# Patient Record
Sex: MALE | HISPANIC OR LATINO | Employment: OTHER | URBAN - METROPOLITAN AREA
[De-identification: names, ages, dates, MRNs, and addresses within clinical notes are randomized per-mention and may not be internally consistent; named-entity substitution may affect disease eponyms.]

---

## 2024-07-20 ENCOUNTER — APPOINTMENT (OUTPATIENT)
Dept: MRI IMAGING | Facility: CLINIC | Age: 39
End: 2024-07-20
Attending: NURSE PRACTITIONER

## 2024-07-20 ENCOUNTER — HOSPITAL ENCOUNTER (EMERGENCY)
Facility: CLINIC | Age: 39
Discharge: HOME | End: 2024-07-20
Attending: EMERGENCY MEDICINE | Admitting: EMERGENCY MEDICINE

## 2024-07-20 ENCOUNTER — APPOINTMENT (OUTPATIENT)
Dept: CT IMAGING | Facility: CLINIC | Age: 39
End: 2024-07-20
Attending: EMERGENCY MEDICINE

## 2024-07-20 VITALS
BODY MASS INDEX: 27.78 KG/M2 | WEIGHT: 177 LBS | HEART RATE: 71 BPM | RESPIRATION RATE: 14 BRPM | SYSTOLIC BLOOD PRESSURE: 121 MMHG | TEMPERATURE: 97.9 F | OXYGEN SATURATION: 97 % | DIASTOLIC BLOOD PRESSURE: 89 MMHG | HEIGHT: 67 IN

## 2024-07-20 DIAGNOSIS — G51.0 BELL'S PALSY: ICD-10-CM

## 2024-07-20 DIAGNOSIS — R29.810 FACIAL DROOP: ICD-10-CM

## 2024-07-20 LAB
ALBUMIN SERPL BCG-MCNC: 4.2 G/DL (ref 3.5–5.2)
ALP SERPL-CCNC: 79 U/L (ref 40–150)
ALT SERPL W P-5'-P-CCNC: 45 U/L (ref 0–70)
ANION GAP SERPL CALCULATED.3IONS-SCNC: 10 MMOL/L (ref 7–15)
AST SERPL W P-5'-P-CCNC: 32 U/L (ref 0–45)
ATRIAL RATE - MUSE: 56 BPM
BASOPHILS # BLD AUTO: 0 10E3/UL (ref 0–0.2)
BASOPHILS NFR BLD AUTO: 0 %
BILIRUB SERPL-MCNC: 0.4 MG/DL
BUN SERPL-MCNC: 18.8 MG/DL (ref 6–20)
CALCIUM SERPL-MCNC: 8.8 MG/DL (ref 8.8–10.4)
CHLORIDE SERPL-SCNC: 105 MMOL/L (ref 98–107)
CREAT SERPL-MCNC: 0.9 MG/DL (ref 0.67–1.17)
CRP SERPL-MCNC: <3 MG/L
DIASTOLIC BLOOD PRESSURE - MUSE: NORMAL MMHG
EGFRCR SERPLBLD CKD-EPI 2021: >90 ML/MIN/1.73M2
EOSINOPHIL # BLD AUTO: 0.2 10E3/UL (ref 0–0.7)
EOSINOPHIL NFR BLD AUTO: 3 %
ERYTHROCYTE [DISTWIDTH] IN BLOOD BY AUTOMATED COUNT: 12.4 % (ref 10–15)
ERYTHROCYTE [SEDIMENTATION RATE] IN BLOOD BY WESTERGREN METHOD: 9 MM/HR (ref 0–15)
GLUCOSE SERPL-MCNC: 107 MG/DL (ref 70–99)
HCO3 SERPL-SCNC: 24 MMOL/L (ref 22–29)
HCT VFR BLD AUTO: 43.6 % (ref 40–53)
HGB BLD-MCNC: 15.6 G/DL (ref 13.3–17.7)
IMM GRANULOCYTES # BLD: 0 10E3/UL
IMM GRANULOCYTES NFR BLD: 0 %
INTERPRETATION ECG - MUSE: NORMAL
LYMPHOCYTES # BLD AUTO: 1.7 10E3/UL (ref 0.8–5.3)
LYMPHOCYTES NFR BLD AUTO: 23 %
MCH RBC QN AUTO: 30.1 PG (ref 26.5–33)
MCHC RBC AUTO-ENTMCNC: 35.8 G/DL (ref 31.5–36.5)
MCV RBC AUTO: 84 FL (ref 78–100)
MONOCYTES # BLD AUTO: 0.5 10E3/UL (ref 0–1.3)
MONOCYTES NFR BLD AUTO: 7 %
NEUTROPHILS # BLD AUTO: 4.9 10E3/UL (ref 1.6–8.3)
NEUTROPHILS NFR BLD AUTO: 67 %
NRBC # BLD AUTO: 0 10E3/UL
NRBC BLD AUTO-RTO: 0 /100
P AXIS - MUSE: 54 DEGREES
PLATELET # BLD AUTO: 192 10E3/UL (ref 150–450)
POTASSIUM SERPL-SCNC: 4 MMOL/L (ref 3.4–5.3)
PR INTERVAL - MUSE: 170 MS
PROT SERPL-MCNC: 7 G/DL (ref 6.4–8.3)
QRS DURATION - MUSE: 106 MS
QT - MUSE: 422 MS
QTC - MUSE: 407 MS
R AXIS - MUSE: 28 DEGREES
RBC # BLD AUTO: 5.18 10E6/UL (ref 4.4–5.9)
SODIUM SERPL-SCNC: 139 MMOL/L (ref 135–145)
SYSTOLIC BLOOD PRESSURE - MUSE: NORMAL MMHG
T AXIS - MUSE: 24 DEGREES
VENTRICULAR RATE- MUSE: 56 BPM
WBC # BLD AUTO: 7.4 10E3/UL (ref 4–11)

## 2024-07-20 PROCEDURE — 70549 MR ANGIOGRAPH NECK W/O&W/DYE: CPT

## 2024-07-20 PROCEDURE — 99285 EMERGENCY DEPT VISIT HI MDM: CPT | Performed by: EMERGENCY MEDICINE

## 2024-07-20 PROCEDURE — 85025 COMPLETE CBC W/AUTO DIFF WBC: CPT | Performed by: EMERGENCY MEDICINE

## 2024-07-20 PROCEDURE — A9585 GADOBUTROL INJECTION: HCPCS | Performed by: EMERGENCY MEDICINE

## 2024-07-20 PROCEDURE — 255N000002 HC RX 255 OP 636: Performed by: EMERGENCY MEDICINE

## 2024-07-20 PROCEDURE — 93010 ELECTROCARDIOGRAM REPORT: CPT | Performed by: EMERGENCY MEDICINE

## 2024-07-20 PROCEDURE — 70544 MR ANGIOGRAPHY HEAD W/O DYE: CPT

## 2024-07-20 PROCEDURE — 85652 RBC SED RATE AUTOMATED: CPT | Performed by: EMERGENCY MEDICINE

## 2024-07-20 PROCEDURE — 80053 COMPREHEN METABOLIC PANEL: CPT | Performed by: EMERGENCY MEDICINE

## 2024-07-20 PROCEDURE — 93005 ELECTROCARDIOGRAM TRACING: CPT | Performed by: EMERGENCY MEDICINE

## 2024-07-20 PROCEDURE — 36415 COLL VENOUS BLD VENIPUNCTURE: CPT | Performed by: EMERGENCY MEDICINE

## 2024-07-20 PROCEDURE — 70553 MRI BRAIN STEM W/O & W/DYE: CPT

## 2024-07-20 PROCEDURE — 86140 C-REACTIVE PROTEIN: CPT | Performed by: EMERGENCY MEDICINE

## 2024-07-20 PROCEDURE — 99207 PR NO BILLABLE SERVICE THIS VISIT: CPT | Performed by: NURSE PRACTITIONER

## 2024-07-20 PROCEDURE — 250N000013 HC RX MED GY IP 250 OP 250 PS 637: Performed by: EMERGENCY MEDICINE

## 2024-07-20 PROCEDURE — 99285 EMERGENCY DEPT VISIT HI MDM: CPT | Mod: 25 | Performed by: EMERGENCY MEDICINE

## 2024-07-20 PROCEDURE — 70450 CT HEAD/BRAIN W/O DYE: CPT

## 2024-07-20 PROCEDURE — 250N000011 HC RX IP 250 OP 636: Performed by: EMERGENCY MEDICINE

## 2024-07-20 PROCEDURE — 250N000012 HC RX MED GY IP 250 OP 636 PS 637: Performed by: EMERGENCY MEDICINE

## 2024-07-20 PROCEDURE — 96374 THER/PROPH/DIAG INJ IV PUSH: CPT | Performed by: EMERGENCY MEDICINE

## 2024-07-20 PROCEDURE — 70553 MRI BRAIN STEM W/O & W/DYE: CPT | Mod: 52,76

## 2024-07-20 RX ORDER — PREDNISONE 20 MG/1
60 TABLET ORAL DAILY
Qty: 21 TABLET | Refills: 0 | Status: SHIPPED | OUTPATIENT
Start: 2024-07-20 | End: 2024-07-27

## 2024-07-20 RX ORDER — VALACYCLOVIR HYDROCHLORIDE 1 G/1
1000 TABLET, FILM COATED ORAL ONCE
Status: COMPLETED | OUTPATIENT
Start: 2024-07-20 | End: 2024-07-20

## 2024-07-20 RX ORDER — GADOBUTROL 604.72 MG/ML
0.1 INJECTION INTRAVENOUS ONCE
Status: COMPLETED | OUTPATIENT
Start: 2024-07-20 | End: 2024-07-20

## 2024-07-20 RX ORDER — POLYETHYLENE GLYCOL, PROPYLENE GLYCOL .4; .3 G/100ML; G/100ML
1 LIQUID OPHTHALMIC
Qty: 30 ML | Refills: 0 | Status: SHIPPED | OUTPATIENT
Start: 2024-07-20 | End: 2024-07-30

## 2024-07-20 RX ORDER — PREDNISONE 20 MG/1
60 TABLET ORAL ONCE
Status: COMPLETED | OUTPATIENT
Start: 2024-07-20 | End: 2024-07-20

## 2024-07-20 RX ORDER — ACETAMINOPHEN 500 MG
1000 TABLET ORAL ONCE
Status: COMPLETED | OUTPATIENT
Start: 2024-07-20 | End: 2024-07-20

## 2024-07-20 RX ORDER — KETOROLAC TROMETHAMINE 15 MG/ML
10 INJECTION, SOLUTION INTRAMUSCULAR; INTRAVENOUS ONCE
Status: COMPLETED | OUTPATIENT
Start: 2024-07-20 | End: 2024-07-20

## 2024-07-20 RX ORDER — VALACYCLOVIR HYDROCHLORIDE 1 G/1
1000 TABLET, FILM COATED ORAL 3 TIMES DAILY
Qty: 21 TABLET | Refills: 0 | Status: SHIPPED | OUTPATIENT
Start: 2024-07-20 | End: 2024-07-27

## 2024-07-20 RX ADMIN — VALACYCLOVIR HYDROCHLORIDE 1000 MG: 1 TABLET, FILM COATED ORAL at 14:46

## 2024-07-20 RX ADMIN — GADOBUTROL 8 ML: 604.72 INJECTION INTRAVENOUS at 12:40

## 2024-07-20 RX ADMIN — KETOROLAC TROMETHAMINE 10 MG: 15 INJECTION, SOLUTION INTRAMUSCULAR; INTRAVENOUS at 14:26

## 2024-07-20 RX ADMIN — POLYETHYLENE GLYCOL 400 AND PROPYLENE GLYCOL 1 DROP: 4; 3 SOLUTION/ DROPS OPHTHALMIC at 14:46

## 2024-07-20 RX ADMIN — GADOBUTROL 8 ML: 604.72 INJECTION INTRAVENOUS at 09:01

## 2024-07-20 RX ADMIN — ACETAMINOPHEN 1000 MG: 500 TABLET ORAL at 14:25

## 2024-07-20 RX ADMIN — PREDNISONE 60 MG: 20 TABLET ORAL at 14:26

## 2024-07-20 ASSESSMENT — ACTIVITIES OF DAILY LIVING (ADL)
ADLS_ACUITY_SCORE: 35
ADLS_ACUITY_SCORE: 35
ADLS_ACUITY_SCORE: 33
ADLS_ACUITY_SCORE: 35

## 2024-07-20 ASSESSMENT — COLUMBIA-SUICIDE SEVERITY RATING SCALE - C-SSRS
1. IN THE PAST MONTH, HAVE YOU WISHED YOU WERE DEAD OR WISHED YOU COULD GO TO SLEEP AND NOT WAKE UP?: NO
6. HAVE YOU EVER DONE ANYTHING, STARTED TO DO ANYTHING, OR PREPARED TO DO ANYTHING TO END YOUR LIFE?: NO
2. HAVE YOU ACTUALLY HAD ANY THOUGHTS OF KILLING YOURSELF IN THE PAST MONTH?: NO

## 2024-07-20 NOTE — ED NOTES
MRI check list completed via . Pt denies having any metal fragments, implanted electrical devices joint or limb replacements. Pt denies any past surgeries or procedures.  paged and arriving in 30 minutes.

## 2024-07-20 NOTE — CONSULTS
"[facility]    Stroke Telephone Note    I was called by Kong Hemphill on 07/20/24 regarding patient Daniel Kirkpatrick. The patient is a 38 year old male with no significant PMH. He comes in to the ED for evaluation of facial weakness. He reports 1 week history of not being able to taste well on the L side of his mouth, along with possible numbness or difficulty feeling food on the L side. In the past 1-2 days has now developed L facial weakness. Per ED provider on examination patient has weakness to L mid and lower face but is able to raise brows symmetrically bilaterally, full tongue strength, no other identified deficits including normal cranial nerve and extremity examination. No reported trauma. Also had transient chest pain, now resolved and EKG normal. SBP 130s. Head CT normal.     Vitals  BP: 115/73   Pulse: 71   Resp: 16   Temp: 98.1  F (36.7  C)   Weight: 80.3 kg (177 lb)    Imaging Findings  CT head: no hemorrhage or other acute findings    Impression  1 week of L taste abnormality, 1-2 days of mid and lower L facial weakness. Etiology unclear     Recommendations  -brain MRI with and without contrast, w/ coronal DWI + thin cuts through the brainstem; please page stroke neurology once completed for further recommendations      Case discussed with vascular neurology attending Dr. Lomas.    My recommendations are based on the information provided over the phone by Daniel Kirkpatrick's in-person providers. They are not intended to replace the clinical judgment of his in-person providers. I was not requested to personally see or examine the patient at this time.     MATTHIAS Aguilar CNP  Vascular Neurology    To page me or covering stroke neurology team member, click here: [facility]  Choose \"On Call\" tab at top, then select \"NEUROLOGY/ALL SITES\" from middle drop-down box, press Enter, then look for \"stroke\" or \"telestroke\" for your site.   "

## 2024-07-20 NOTE — ED PROVIDER NOTES
"ED Provider Note  [facility]      History     Chief Complaint   Patient presents with    Facial Pain     Pt having R facial, ear side pain since yesterday (7/19) 11 am, cause unknown to patient.     HPI  Daniel Kirkpatrick is an otherwise healthy 38 year old male who presents to the ER for evaluation of facial weakness.  Patient reports 1 week of symptoms of feeling like he had an odd sensation in the left side of his mouth when chewing food as if taste was not present on the left side.  Then in the past couple days he noticed that he had decreased movement of the left side of his face affecting the mouth.    Of note, the triage note states that the patient had right facial and ear pain, however he does not have right-sided symptoms and does not have pain.  The symptoms are on the left side and affects the mouth and face with decreased movement but no pain.    Denies fevers, chills, other complaints. Can move eyebrows on both sides.     Past Medical History  No past medical history on file.  No past surgical history on file.  polyethylene glycol-propylene glycol (LUBRICATING EYE DROPS) 0.4-0.3 % SOLN ophthalmic solution  predniSONE (DELTASONE) 20 MG tablet  valACYclovir (VALTREX) 1000 mg tablet      Not on File  Family History  No family history on file.  Social History       A medically appropriate review of systems was performed with pertinent positives and negatives noted in the HPI, and all other systems negative.    Physical Exam   BP: 137/82  Pulse: 68  Temp: 98.2  F (36.8  C)  Resp: 16  Height: 170.2 cm (5' 7\")  Weight: 80.3 kg (177 lb)  SpO2: 98 %  Physical Exam  Physical Exam   Constitutional: Pt is oriented to person, place, and time.Pt appears well-developed and well-nourished.   HENT:   Head: Normocephalic and atraumatic.   Eyes: Conjunctivae are normal. Pupils are equal, round, and reactive to light.   Neck: Normal range of motion. Neck supple.   Cardiovascular: " Normal rate, regular rhythm, normal heart sounds and intact distal pulses.    Pulmonary/Chest: Effort normal and breath sounds normal. No respiratory distress. Pt has no wheezes. Pt has no rales  Abdominal: Soft. Bowel sounds are normal. Pt exhibits no distension and no mass. No tenderness. Pt has no rebound and no guarding.   Musculoskeletal: Normal range of motion. Pt exhibits no edema.   Neurological: Pt is alert and oriented to person, place, and time. Left sided facial droop and inability to crinkle nose on the left; full eyebrow movement bilaterally.   Skin: Skin is warm and dry. No rash noted.   Psychiatric: Pt has a normal mood and affect. Behavior is normal. Judgment and thought content normal.       ED Course, Procedures, & Data      Procedures            EKG Interpretation:      Interpreted by Kong Hemphill MD  Time reviewed: 2:00am  Symptoms at time of EKG: chest pain   Rhythm: normal sinus   Rate: normal  Axis: normal  Ectopy: none  Conduction: normal  ST Segments/ T Waves: No ST-T wave changes  Q Waves: none  Comparison to prior: No old EKG available    Clinical Impression: normal EKG           Results for orders placed or performed during the hospital encounter of 07/20/24   Head CT w/o contrast     Status: None    Narrative    EXAM: CT HEAD WITHOUT CONTRAST  LOCATION: [facility]  DATE: 07/20/2024    INDICATION: Left face and arm weakness for 7 days.  COMPARISON: None.  TECHNIQUE: Routine CT Head without IV contrast. Multiplanar reformats. Dose reduction techniques were used.    FINDINGS:  INTRACRANIAL CONTENTS: No intracranial hemorrhage, extra-axial collection, or mass effect.  No CT evidence of acute infarct. Normal parenchymal attenuation. Normal ventricles and sulci.     VISUALIZED ORBITS/SINUSES/MASTOIDS: No intraorbital abnormality. No paranasal sinus mucosal disease. No middle ear or mastoid effusion.    BONES/SOFT TISSUES: No acute  abnormality.      Impression    IMPRESSION:  1.  No acute intracranial process.       MR Brain w/o & w Contrast     Status: None    Narrative    EXAM: MR BRAIN W/O and W CONTRAST, MRA NECK (CAROTIDS) W/O and W CONTRAST, MRA BRAIN ([place]) W/O CONTRAST  LOCATION: [facility]  DATE: 7/20/2024    INDICATION: Left facial droop   weakness, CT negative, 1 day duration; *pls add coronal DWI w  thin cuts through the brainstem*  COMPARISON: Head CT earlier same-day.  CONTRAST: 8 cc Gadavist  TECHNIQUE:   1) Routine multiplanar multisequence head MRI without and with intravenous contrast.  2) 3D time-of-flight head MRA without intravenous contrast.  3) Neck MRA without and with IV contrast. Stenosis measurements made according to NASCET criteria unless otherwise specified.    FINDINGS:  HEAD MRI:  INTRACRANIAL CONTENTS: No acute or subacute infarct. No mass, acute hemorrhage, or extra-axial fluid collections. Normal brain parenchymal signal. Normal ventricles and sulci. Normal position of the cerebellar tonsils. Question minimal contrast   enhancement in the left IAC (series 14, image 7).    SELLA: No abnormality accounting for technique.    OSSEOUS STRUCTURES/SOFT TISSUES: Normal marrow signal. The major intracranial vascular flow voids are maintained.     ORBITS: No abnormality accounting for technique.     SINUSES/MASTOIDS: Mild to moderate mucosal thickening scattered about the paranasal sinuses. Scattered fluid/membrane thickening in the left mastoid air cells. No apparent mass in the posterior nasopharynx or skull base.     HEAD MRA:   ANTERIOR CIRCULATION: No stenosis/occlusion, aneurysm, or high flow vascular malformation. Standard [place] anatomy.    POSTERIOR CIRCULATION: No stenosis/occlusion, aneurysm, or high flow vascular malformation. Balanced vertebral arteries supply a normal basilar artery.     NECK MRA:   RIGHT CAROTID: No measurable stenosis  or dissection.    LEFT CAROTID: No measurable stenosis or dissection.    VERTEBRAL ARTERIES: No focal stenosis or dissection. Balanced vertebral arteries.    AORTIC ARCH: Classic aortic arch anatomy with no significant stenosis at the origin of the great vessels.      Impression    IMPRESSION:  HEAD MRI:   1.  Question minimal contrast enhancement in the left IAC (series 14, image 7 and series 15, image 15). This can be artifactual or which may represent Bell's palsy. Recommend clinical correlation and consider repeat imaging with thin section images   focusing on IACs.  2.  Scattered paranasal sinus mucosal thickening.    HEAD MRA:   1.  Normal MRA [place].    NECK MRA:  1.  Normal neck MRA.   MRA Brain ([place] of [place]) wo Contrast     Status: None    Narrative    EXAM: MR BRAIN W/O and W CONTRAST, MRA NECK (CAROTIDS) W/O and W CONTRAST, MRA BRAIN ([place]) W/O CONTRAST  LOCATION: [facility]  DATE: 7/20/2024    INDICATION: Left facial droop   weakness, CT negative, 1 day duration; *pls add coronal DWI w  thin cuts through the brainstem*  COMPARISON: Head CT earlier same-day.  CONTRAST: 8 cc Gadavist  TECHNIQUE:   1) Routine multiplanar multisequence head MRI without and with intravenous contrast.  2) 3D time-of-flight head MRA without intravenous contrast.  3) Neck MRA without and with IV contrast. Stenosis measurements made according to NASCET criteria unless otherwise specified.    FINDINGS:  HEAD MRI:  INTRACRANIAL CONTENTS: No acute or subacute infarct. No mass, acute hemorrhage, or extra-axial fluid collections. Normal brain parenchymal signal. Normal ventricles and sulci. Normal position of the cerebellar tonsils. Question minimal contrast   enhancement in the left IAC (series 14, image 7).    SELLA: No abnormality accounting for technique.    OSSEOUS STRUCTURES/SOFT TISSUES: Normal marrow signal. The major intracranial vascular flow voids are  maintained.     ORBITS: No abnormality accounting for technique.     SINUSES/MASTOIDS: Mild to moderate mucosal thickening scattered about the paranasal sinuses. Scattered fluid/membrane thickening in the left mastoid air cells. No apparent mass in the posterior nasopharynx or skull base.     HEAD MRA:   ANTERIOR CIRCULATION: No stenosis/occlusion, aneurysm, or high flow vascular malformation. Standard [place] of [place] anatomy.    POSTERIOR CIRCULATION: No stenosis/occlusion, aneurysm, or high flow vascular malformation. Balanced vertebral arteries supply a normal basilar artery.     NECK MRA:   RIGHT CAROTID: No measurable stenosis or dissection.    LEFT CAROTID: No measurable stenosis or dissection.    VERTEBRAL ARTERIES: No focal stenosis or dissection. Balanced vertebral arteries.    AORTIC ARCH: Classic aortic arch anatomy with no significant stenosis at the origin of the great vessels.      Impression    IMPRESSION:  HEAD MRI:   1.  Question minimal contrast enhancement in the left IAC (series 14, image 7 and series 15, image 15). This can be artifactual or which may represent Bell's palsy. Recommend clinical correlation and consider repeat imaging with thin section images   focusing on IACs.  2.  Scattered paranasal sinus mucosal thickening.    HEAD MRA:   1.  Normal MRA [place] of [place].    NECK MRA:  1.  Normal neck MRA.   MRA Neck (Carotids) wo & w Contrast     Status: None    Narrative    EXAM: MR BRAIN W/O and W CONTRAST, MRA NECK (CAROTIDS) W/O and W CONTRAST, MRA BRAIN ([place]) W/O CONTRAST  LOCATION: [facility]  DATE: 7/20/2024    INDICATION: Left facial droop   weakness, CT negative, 1 day duration; *pls add coronal DWI w  thin cuts through the brainstem*  COMPARISON: Head CT earlier same-day.  CONTRAST: 8 cc Gadavist  TECHNIQUE:   1) Routine multiplanar multisequence head MRI without and with intravenous contrast.  2) 3D time-of-flight head MRA  without intravenous contrast.  3) Neck MRA without and with IV contrast. Stenosis measurements made according to NASCET criteria unless otherwise specified.    FINDINGS:  HEAD MRI:  INTRACRANIAL CONTENTS: No acute or subacute infarct. No mass, acute hemorrhage, or extra-axial fluid collections. Normal brain parenchymal signal. Normal ventricles and sulci. Normal position of the cerebellar tonsils. Question minimal contrast   enhancement in the left IAC (series 14, image 7).    SELLA: No abnormality accounting for technique.    OSSEOUS STRUCTURES/SOFT TISSUES: Normal marrow signal. The major intracranial vascular flow voids are maintained.     ORBITS: No abnormality accounting for technique.     SINUSES/MASTOIDS: Mild to moderate mucosal thickening scattered about the paranasal sinuses. Scattered fluid/membrane thickening in the left mastoid air cells. No apparent mass in the posterior nasopharynx or skull base.     HEAD MRA:   ANTERIOR CIRCULATION: No stenosis/occlusion, aneurysm, or high flow vascular malformation. Standard [place] of [place] anatomy.    POSTERIOR CIRCULATION: No stenosis/occlusion, aneurysm, or high flow vascular malformation. Balanced vertebral arteries supply a normal basilar artery.     NECK MRA:   RIGHT CAROTID: No measurable stenosis or dissection.    LEFT CAROTID: No measurable stenosis or dissection.    VERTEBRAL ARTERIES: No focal stenosis or dissection. Balanced vertebral arteries.    AORTIC ARCH: Classic aortic arch anatomy with no significant stenosis at the origin of the great vessels.      Impression    IMPRESSION:  HEAD MRI:   1.  Question minimal contrast enhancement in the left IAC (series 14, image 7 and series 15, image 15). This can be artifactual or which may represent Bell's palsy. Recommend clinical correlation and consider repeat imaging with thin section images   focusing on IACs.  2.  Scattered paranasal sinus mucosal thickening.    HEAD MRA:   1.  Normal MRA [place].    NECK MRA:  1.  Normal neck MRA.   MR Brain w/o & w Contrast     Status: None    Narrative    EXAM: MR BRAIN W/O and W CONTRAST  LOCATION: [facility]  DATE: 7/20/2024    INDICATION: *focus thin cuts of IAC* possible Bell's palsy  COMPARISON: Brain MRI and CT earlier same-day.  CONTRAST: 8ml gadavist  TECHNIQUE: Multiplanar multisequence head MRI without and with intravenous contrast including dedicated imaging of the internal auditory canals.    FINDINGS:    IAC: Mild enhancement in the distal left IAC (series 12, image 10 and series 13, image 8). Dedicated imaging of the internal auditory canals demonstrates normal cranial nerve VII and VIII complexes bilaterally. No cerebellopontine angle or internal   auditory canal mass. No pathologic cranial nerve or temporal bone contrast enhancement. Inner ear structures demonstrate expected fluid signal intensity. No middle ear or mastoid effusion.    INTRACRANIAL CONTENTS: No acute or subacute infarct. No mass, acute hemorrhage, or extra-axial fluid collections. Normal brain parenchymal signal. Normal ventricles and sulci. Normal position of the cerebellar tonsils. No pathologic contrast enhancement.    OTHER: Accounting for technique no additional abnormalities identified.      Impression    IMPRESSION:  1.  Punctate asymmetric contrast enhancement in the distal left IAC (series 12, image 10 and series 13, image 8). This can be seen with Bell's palsy.    Findings were discussed with  at 13:20 hours CDT.   Comprehensive metabolic panel     Status: Abnormal   Result Value Ref Range    Sodium 139 135 - 145 mmol/L    Potassium 4.0 3.4 - 5.3 mmol/L    Carbon Dioxide (CO2) 24 22 - 29 mmol/L    Anion Gap 10 7 - 15 mmol/L    Urea Nitrogen 18.8 6.0 - 20.0 mg/dL    Creatinine 0.90 0.67 - 1.17 mg/dL    GFR Estimate >90 >60 mL/min/1.73m2    Calcium 8.8 8.8 - 10.4 mg/dL    Chloride 105 98 - 107 mmol/L    Glucose 107 (H)  70 - 99 mg/dL    Alkaline Phosphatase 79 40 - 150 U/L    AST 32 0 - 45 U/L    ALT 45 0 - 70 U/L    Protein Total 7.0 6.4 - 8.3 g/dL    Albumin 4.2 3.5 - 5.2 g/dL    Bilirubin Total 0.4 <=1.2 mg/dL   Erythrocyte sedimentation rate auto     Status: Normal   Result Value Ref Range    Erythrocyte Sedimentation Rate 9 0 - 15 mm/hr   CRP inflammation     Status: Normal   Result Value Ref Range    CRP Inflammation <3.00 <5.00 mg/L   CBC with platelets and differential     Status: None   Result Value Ref Range    WBC Count 7.4 4.0 - 11.0 10e3/uL    RBC Count 5.18 4.40 - 5.90 10e6/uL    Hemoglobin 15.6 13.3 - 17.7 g/dL    Hematocrit 43.6 40.0 - 53.0 %    MCV 84 78 - 100 fL    MCH 30.1 26.5 - 33.0 pg    MCHC 35.8 31.5 - 36.5 g/dL    RDW 12.4 10.0 - 15.0 %    Platelet Count 192 150 - 450 10e3/uL    % Neutrophils 67 %    % Lymphocytes 23 %    % Monocytes 7 %    % Eosinophils 3 %    % Basophils 0 %    % Immature Granulocytes 0 %    NRBCs per 100 WBC 0 <1 /100    Absolute Neutrophils 4.9 1.6 - 8.3 10e3/uL    Absolute Lymphocytes 1.7 0.8 - 5.3 10e3/uL    Absolute Monocytes 0.5 0.0 - 1.3 10e3/uL    Absolute Eosinophils 0.2 0.0 - 0.7 10e3/uL    Absolute Basophils 0.0 0.0 - 0.2 10e3/uL    Absolute Immature Granulocytes 0.0 <=0.4 10e3/uL    Absolute NRBCs 0.0 10e3/uL   EKG 12 lead     Status: None   Result Value Ref Range    Systolic Blood Pressure  mmHg    Diastolic Blood Pressure  mmHg    Ventricular Rate 56 BPM    Atrial Rate 56 BPM    MO Interval 170 ms    QRS Duration 106 ms     ms    QTc 407 ms    P Axis 54 degrees    R AXIS 28 degrees    T Axis 24 degrees    Interpretation ECG       Sinus bradycardia  Otherwise normal ECG  Unconfirmed report - interpretation of this ECG is computer generated - see medical record for final interpretation  Confirmed by - EMERGENCY ROOM, PHYSICIAN ([id number]),  QUINN TILLEY ([id number]) on 7/20/2024 8:51:52 AM     CBC with Platelets & Differential     Status: None    Narrative    The  following orders were created for panel order CBC with Platelets & Differential.  Procedure                               Abnormality         Status                     ---------                               -----------         ------                     CBC with platelets and d...[[id number]]                      Final result                 Please view results for these tests on the individual orders.     Medications   gadobutrol (GADAVIST) injection 8 mL (8 mLs Intravenous $Given 7/20/24 0901)   gadobutrol (GADAVIST) injection 8 mL (8 mLs Intravenous $Given 7/20/24 1240)   predniSONE (DELTASONE) tablet 60 mg (60 mg Oral $Given 7/20/24 1426)   ketorolac (TORADOL) injection 10 mg (10 mg Intravenous $Given 7/20/24 1426)   acetaminophen (TYLENOL) tablet 1,000 mg (1,000 mg Oral $Given 7/20/24 1425)   polyethylene glycol-propylene glycol PF (SYSTANE ULTRA PF) opthalmic solution 1 drop (1 drop Left Eye $Given 7/20/24 1446)   valACYclovir (VALTREX) tablet 1,000 mg (1,000 mg Oral $Given 7/20/24 1446)     Labs Ordered and Resulted from Time of ED Arrival to Time of ED Departure   COMPREHENSIVE METABOLIC PANEL - Abnormal       Result Value    Sodium 139      Potassium 4.0      Carbon Dioxide (CO2) 24      Anion Gap 10      Urea Nitrogen 18.8      Creatinine 0.90      GFR Estimate >90      Calcium 8.8      Chloride 105      Glucose 107 (*)     Alkaline Phosphatase 79      AST 32      ALT 45      Protein Total 7.0      Albumin 4.2      Bilirubin Total 0.4     ERYTHROCYTE SEDIMENTATION RATE AUTO - Normal    Erythrocyte Sedimentation Rate 9     CRP INFLAMMATION - Normal    CRP Inflammation <3.00     CBC WITH PLATELETS AND DIFFERENTIAL    WBC Count 7.4      RBC Count 5.18      Hemoglobin 15.6      Hematocrit 43.6      MCV 84      MCH 30.1      MCHC 35.8      RDW 12.4      Platelet Count 192      % Neutrophils 67      % Lymphocytes 23      % Monocytes 7      % Eosinophils 3      % Basophils 0      % Immature Granulocytes 0       NRBCs per 100 WBC 0      Absolute Neutrophils 4.9      Absolute Lymphocytes 1.7      Absolute Monocytes 0.5      Absolute Eosinophils 0.2      Absolute Basophils 0.0      Absolute Immature Granulocytes 0.0      Absolute NRBCs 0.0       MR Brain w/o & w Contrast   Final Result   IMPRESSION:   1.  Punctate asymmetric contrast enhancement in the distal left IAC (series 12, image 10 and series 13, image 8). This can be seen with Bell's palsy.      Findings were discussed with  at 13:20 hours CDT.      MRA Neck (Carotids) wo & w Contrast   Final Result   IMPRESSION:   HEAD MRI:    1.  Question minimal contrast enhancement in the left IAC (series 14, image 7 and series 15, image 15). This can be artifactual or which may represent Bell's palsy. Recommend clinical correlation and consider repeat imaging with thin section images    focusing on IACs.   2.  Scattered paranasal sinus mucosal thickening.      HEAD MRA:    1.  Normal MRA [place].      NECK MRA:   1.  Normal neck MRA.      MRA Brain ([place] of [place]) wo Contrast   Final Result   IMPRESSION:   HEAD MRI:    1.  Question minimal contrast enhancement in the left IAC (series 14, image 7 and series 15, image 15). This can be artifactual or which may represent Bell's palsy. Recommend clinical correlation and consider repeat imaging with thin section images    focusing on IACs.   2.  Scattered paranasal sinus mucosal thickening.      HEAD MRA:    1.  Normal MRA [place].      NECK MRA:   1.  Normal neck MRA.      MR Brain w/o & w Contrast   Final Result   IMPRESSION:   HEAD MRI:    1.  Question minimal contrast enhancement in the left IAC (series 14, image 7 and series 15, image 15). This can be artifactual or which may represent Bell's palsy. Recommend clinical correlation and consider repeat imaging with thin section images    focusing on IACs.   2.  Scattered paranasal sinus mucosal thickening.      HEAD MRA:    1.  Normal MRA [place] of  [place].      NECK MRA:   1.  Normal neck MRA.      Head CT w/o contrast   Final Result   IMPRESSION:   1.  No acute intracranial process.                   Critical care was not performed.     Medical Decision Making  The patient's presentation was of moderate complexity (an undiagnosed new problem with uncertain prognosis).    The patient's evaluation involved:  ordering and/or review of 3+ test(s) in this encounter (see separate area of note for details)  discussion of management or test interpretation with another health professional (see separate area of note for details)    The patient's management necessitated further care after sign-out to Dr. Blackmon (see their note for further management).    Assessment & Plan    Daniel Kirkpatrick is an otherwise healthy 38 year old male who presents to the ER for evaluation of facial weakness.  Patient is nontoxic appearing on exam, has vital signs within normal limits. On exam patient exhibits left facial weakness involving his lip and nose, but he has full movement of eyebrows bilaterally. No additional neurologic deficits on exam; 5/5  strength bilaterally and 5/5 plantar and dorsi-flexion strength at ankles. EKG obtained and showed normal sinus rhythm. While this may represent early [place] Palsy, patient has movement of eyebrows bilaterally now, and will workup central lesion such as stroke that may be contributing to symptoms. Neurology stroke team consulted. Patient signed out pending MR brain studies to further assess, and signed out pending imaging results.     Left facial droop involving nose and lips. Can move eyebrows on both sides.  MRI pending  Neuro consult pending     I have reviewed the nursing notes. I have reviewed the findings, diagnosis, plan and need for follow up with the patient.    Discharge Medication List as of 7/20/2024  2:22 PM        START taking these medications    Details   polyethylene glycol-propylene glycol (LUBRICATING EYE  DROPS) 0.4-0.3 % SOLN ophthalmic solution Place 1 drop Into the left eye every 2 hours (while awake) for 10 days, Disp-30 mL, R-0, E-Prescribe      predniSONE (DELTASONE) 20 MG tablet Take 3 tablets (60 mg) by mouth daily for 7 days, Disp-21 tablet, R-0, E-Prescribe      valACYclovir (VALTREX) 1000 mg tablet Take 1 tablet (1,000 mg) by mouth 3 times daily for 7 days, Disp-21 tablet, R-0, E-Prescribe             Final diagnoses:   Facial droop   Bell's palsy       Kong Hemphill MD  [facility] EMERGENCY DEPARTMENT  7/20/2024     Kong Hemphill MD  07/21/24 [id number]

## 2024-07-20 NOTE — ED PROVIDER NOTES
7 AM -signed out pending MRI for stroke workup given left-sided facial droop    2:15 PM -after repeat MRI and discussion with stroke neurology, diagnosis is Bell's palsy with involvement of eye.  I have discussed with the patient regarding need for artificial tears, 2 types of oral medications for Bell's palsy treatment, and have provided outpatient follow-up with referral to primary care doctor.    Toradol and acetaminophen one-time for subacute headache     Peña Blackmon MD  07/20/24 [id number]

## 2024-07-20 NOTE — DISCHARGE INSTRUCTIONS
Your MRI is consistent with Bell's palsy.  We will treat you with steroids for this.    Please follow-up with your primary medical doctor   Use lubricating drops at least every 3 hours while awake.  While sleeping, apply artificial tears prior to sleeping and tape the eyelid shut to prevent drying out your eyes and creating a problem with the cornea  Please follow-up with a primary care doctor --a referral has been placed on your behalf  For pain, please take 975-1000mg acetaminophen (tylenol) every 8 hours -- do not take more than 3000mg in a 24 hour period.    You can also take 600mg ibuprofen (motrin/advil) every 6 hours for pain

## 2024-07-23 ENCOUNTER — APPOINTMENT (OUTPATIENT)
Dept: ADMINISTRATIVE | Facility: CLINIC | Age: 39
End: 2024-07-23